# Patient Record
Sex: FEMALE | Race: WHITE | NOT HISPANIC OR LATINO | Employment: FULL TIME | URBAN - METROPOLITAN AREA
[De-identification: names, ages, dates, MRNs, and addresses within clinical notes are randomized per-mention and may not be internally consistent; named-entity substitution may affect disease eponyms.]

---

## 2022-01-17 ENCOUNTER — APPOINTMENT (OUTPATIENT)
Dept: RADIOLOGY | Facility: HOSPITAL | Age: 22
End: 2022-01-17
Attending: EMERGENCY MEDICINE
Payer: COMMERCIAL

## 2022-01-17 ENCOUNTER — HOSPITAL ENCOUNTER (EMERGENCY)
Facility: HOSPITAL | Age: 22
Discharge: HOME | End: 2022-01-17
Attending: EMERGENCY MEDICINE | Admitting: EMERGENCY MEDICINE
Payer: COMMERCIAL

## 2022-01-17 VITALS
HEIGHT: 64 IN | DIASTOLIC BLOOD PRESSURE: 90 MMHG | OXYGEN SATURATION: 100 % | WEIGHT: 98 LBS | SYSTOLIC BLOOD PRESSURE: 122 MMHG | BODY MASS INDEX: 16.73 KG/M2 | TEMPERATURE: 97.8 F | HEART RATE: 104 BPM | RESPIRATION RATE: 16 BRPM

## 2022-01-17 DIAGNOSIS — U07.1 INFECTION DUE TO 2019 NOVEL CORONAVIRUS: ICD-10-CM

## 2022-01-17 PROCEDURE — 99284 EMERGENCY DEPT VISIT MOD MDM: CPT | Mod: 25

## 2022-01-17 PROCEDURE — 93005 ELECTROCARDIOGRAM TRACING: CPT | Performed by: EMERGENCY MEDICINE

## 2022-01-17 PROCEDURE — 71046 X-RAY EXAM CHEST 2 VIEWS: CPT

## 2022-01-17 ASSESSMENT — MIFFLIN-ST. JEOR: SCORE: 1204.53

## 2022-01-17 NOTE — ED TRIAGE NOTES
Tested positive for COVID 1/12/22. Pt feels short of breath. Get episodes where it feels like she'ssuffocating and hard to expand lungs. % RA in triage. C/o chest heaviness.

## 2022-01-17 NOTE — ED PROVIDER NOTES
EMERGENCY DEPARTMENT ENCOUnter      NAME: Qi Locke  AGE: 21 year old female  YOB: 2000  MRN: [id number]  EVALUATION DATE & TIME: No admission date for patient encounter.    PCP: No primary care provider on file.    ED PROVIDER: Jesu Wick DO      Chief Complaint   Patient presents with     COVID +     Shortness of Breath         FINAL IMPRESSION:  1. Infection due to 2019 novel coronavirus          ED COURSE & MEDICAL DECISION MAKING:    The patient presents to the emergency department today with complaints of shortness of breath over the past few days.  She also complained of some chest tightness.  She was recently diagnosed with COVID.  She clinically appeared well on exam.  EKG and chest x-ray are unremarkable.  At this time I believe that her symptoms are related to her recent COVID diagnosis and feels she can be safely discharged home.  She is comfortable with this plan.    At the conclusion of the encounter I discussed the results of all of the tests and the disposition. The questions were answered. The patient or family acknowledged understanding and was agreeable with the care plan.       =================================================================    HPI      Qi Locke is a 21 year old female presenting to the Emergency Department with a chief complaint of dyspnea for the past few days.  Recently diagnosed with Covid.  Feels a tightness across her chest.        REVIEW OF SYSTEMS     Constitutional:  Denies fever or chills  HENT:  Denies sore throat   Respiratory: Positive for shortness of breath  Cardiovascular: Positive for chest tightness  GI:  Denies abdominal pain, nausea, or vomiting  Musculoskeletal:  Denies any new extremity pain   Skin:  Denies rash   Neurologic:  Denies headache, focal weakness or sensory changes    All other systems reviewed and are negative      PAST MEDICAL HISTORY:  No past medical history on file.    PAST SURGICAL HISTORY:  No past surgical  "history on file.        CURRENT MEDICATIONS:    ondansetron (ZOFRAN ODT) 4 MG disintegrating tablet        ALLERGIES:  No Known Allergies    FAMILY HISTORY:  No family history on file.    SOCIAL HISTORY:   Social History     Socioeconomic History     Marital status: Single     Spouse name: Not on file     Number of children: Not on file     Years of education: Not on file     Highest education level: Not on file   Occupational History     Not on file   Tobacco Use     Smoking status: Not on file     Smokeless tobacco: Not on file   Substance and Sexual Activity     Alcohol use: Not on file     Drug use: Not on file     Sexual activity: Not on file   Other Topics Concern     Not on file   Social History Narrative     Not on file     Social Determinants of Health     Financial Resource Strain: Not on file   Food Insecurity: Not on file   Transportation Needs: Not on file   Physical Activity: Not on file   Stress: Not on file   Social Connections: Not on file   Intimate Partner Violence: Not on file   Housing Stability: Not on file       VITAL SIGNS: /65   Pulse 75   Temp 97.8  F (36.6  C) (Temporal)   Resp 16   Ht 1.626 m (5' 4\")   Wt 44.5 kg (98 lb)   SpO2 97%   BMI 16.82 kg/m     Constitutional:  Well developed, Well nourished,  HENT:  Normocephalic, Atraumatic, Oropharynx moist, Nose normal.   Neck:  Normal range of motion, Supple, No stridor.   Eyes:  EOMI, Conjunctiva normal, No discharge.   Respiratory:  Breathing comfortably, No respiratory distress,    Cardiovascular:  Normal pulses   Musculoskeletal:  No edema or cyanosis, no deformities  Integument:  Dry, No erythema, No rash.  Neurologic:  Alert & oriented x 3, No obvious focal deficits noted.   Psychiatric:  Affect normal, Judgment normal, Mood normal.          RADIOLOGY:  I have independently reviewed and interpreted the above imaging, pending the final radiology read.  XR Chest 2 Views   Final Result   IMPRESSION: The lungs are clear. There " is no pneumothorax or pleural effusion. There is mild hyperinflation. The heart size and pulmonary vascularity are normal.          EKG:    Normal sinus rhythm at 65 bpm.  Normal axis.  No signs of acute ischemia.  QRS 84 ms,  ms.    I have independently reviewed and interpreted this EKG        Jesu Wick DO  Emergency Medicine  [facility] EMERGENCY DEPARTMENT  [facility] [id number] [phone]  Dept: [phone]       Jesu Wick MD  01/17/22 4705

## 2022-01-17 NOTE — ED NOTES
"Unvaccinated Pt is upset and tearful states \"why can't they give me something\" \"I can't sleep\" \"it feels like I have a plastic bag over my head\". Instructed tx symptoms with increased fluid intake tylenol, ibuprofen.  O2 sats 100% RA.   "

## 2022-01-17 NOTE — DISCHARGE INSTRUCTIONS
Your symptoms today appear to be related to your recent COVID diagnosis.  During plenty of fluids and follow-up closely with your primary care doctor.  Return to the emergency department if you develop any worsening symptoms or if you have any other concerns.

## 2022-01-18 ENCOUNTER — PATIENT OUTREACH (OUTPATIENT)
Dept: CARE COORDINATION | Facility: CLINIC | Age: 22
End: 2022-01-18
Payer: COMMERCIAL

## 2022-01-18 DIAGNOSIS — Z71.89 OTHER SPECIFIED COUNSELING: ICD-10-CM

## 2022-01-18 LAB
ATRIAL RATE - MUSE: 65 BPM
DIASTOLIC BLOOD PRESSURE - MUSE: NORMAL MMHG
INTERPRETATION ECG - MUSE: NORMAL
P AXIS - MUSE: 52 DEGREES
PR INTERVAL - MUSE: 124 MS
QRS DURATION - MUSE: 84 MS
QT - MUSE: 428 MS
QTC - MUSE: 445 MS
R AXIS - MUSE: 87 DEGREES
SYSTOLIC BLOOD PRESSURE - MUSE: NORMAL MMHG
T AXIS - MUSE: 65 DEGREES
VENTRICULAR RATE- MUSE: 65 BPM

## 2022-01-18 NOTE — PROGRESS NOTES
"Clinic Care Coordination Contact  [facility]: Post-Discharge Note  SITUATION                                                      Admission:    Admission Date: 01/17/22   Reason for Admission: Infection due to 2019 novel coronavirus  Discharge:   Discharge Date: 01/17/22  Discharge Diagnosis: Infection due to 2019 novel coronavirus    BACKGROUND                                                      Qi Locke is a 21 year old female presenting to the Emergency Department with a chief complaint of dyspnea for the past few days.  Recently diagnosed with Covid.  Feels a tightness across her chest.    ASSESSMENT      Enrollment  Primary Care Care Coordination Status: Not a Candidate    Discharge Assessment  How are you doing now that you are home?: \"Still experiencing pain with breathing\"  How are your symptoms? (Red Flag symptoms escalate to triage hotline per guidelines): Unchanged  Do you feel your condition is stable enough to be safe at home until your provider visit?: Yes  Does the patient have their discharge instructions? : Yes  Does the patient have questions regarding their discharge instructions? : No  Were you started on any new medications or were there changes to any of your previous medications? : No  Does the patient have all of their medications?: Yes  Do you have questions regarding any of your medications? : No  Do you have all of your needed medical supplies or equipment (DME)?  (i.e. oxygen tank, CPAP, cane, etc.): Yes  Discharge follow-up appointment scheduled within 14 calendar days? : No  Is patient agreeable to assistance with scheduling? : No    Post-op ([facility] Only)  If the patient had a surgery or procedure, do they have any questions for a nurse?: No      PLAN                                                      Outpatient Plan:    Follow up with your doctor in 2 days (1/19/2022)    No future appointments.      For any urgent concerns, please contact our 24 hour nurse triage " line: [phone] ([phone])         MINOO Wu  [phone]  [facility]

## 2024-09-07 ENCOUNTER — OFFICE VISIT (OUTPATIENT)
Dept: FAMILY MEDICINE | Facility: CLINIC | Age: 24
End: 2024-09-07
Payer: MEDICAID

## 2024-09-07 VITALS
RESPIRATION RATE: 18 BRPM | DIASTOLIC BLOOD PRESSURE: 73 MMHG | TEMPERATURE: 98.2 F | SYSTOLIC BLOOD PRESSURE: 116 MMHG | HEART RATE: 77 BPM | OXYGEN SATURATION: 98 %

## 2024-09-07 DIAGNOSIS — L02.91 ABSCESS: Primary | ICD-10-CM

## 2024-09-07 PROCEDURE — 87077 CULTURE AEROBIC IDENTIFY: CPT | Performed by: PHYSICIAN ASSISTANT

## 2024-09-07 PROCEDURE — 87070 CULTURE OTHR SPECIMN AEROBIC: CPT | Performed by: PHYSICIAN ASSISTANT

## 2024-09-07 PROCEDURE — 10060 I&D ABSCESS SIMPLE/SINGLE: CPT | Performed by: PHYSICIAN ASSISTANT

## 2024-09-08 NOTE — PATIENT INSTRUCTIONS
Keep dry for the next 24 hours.   Change bandage if it becomes dirty or saturated.   Take Tylenol as needed for pain.   Follow up if it refills and is inflamed or if you develop growing redness.   If the cyts returns and is just a hard ball you can have them removed, but I recommend following up with primary care in that case.   Your wound culture will be back in a few days. We will only call if we need to start you on a treatment.

## 2024-09-08 NOTE — PROGRESS NOTES
Patient presents with:  Mass: 1 month Bump on left side of ear.          ICD-10-CM    1. Abscess  L02.91 Swab Aerobic Bacterial Culture Routine Without Gram Stain          Patient Instructions   Keep dry for the next 24 hours.   Change bandage if it becomes dirty or saturated.   Take Tylenol as needed for pain.   Follow up if it refills and is inflamed or if you develop growing redness.   If the cyts returns and is just a hard ball you can have them removed, but I recommend following up with primary care in that case.   Your wound culture will be back in a few days. We will only call if we need to start you on a treatment.     HPI:  Summer R Chucky is a 23 year old female who presents today with concerns of bump behind the right ear that is been present for 1 month, became more squishy and painful over the past 1 week.  No fevers or chills.    History obtained from the patient.    Problem List:  There are no relevant problems documented for this patient.      No past medical history on file.    Social History     Tobacco Use    Smoking status: Some Days     Types: Vaping Device    Smokeless tobacco: Not on file   Substance Use Topics    Alcohol use: Not on file         Review of Systems    Vitals:    09/07/24 1913   BP: 116/73   BP Location: Left arm   Patient Position: Sitting   Cuff Size: Adult Regular   Pulse: 77   Resp: 18   Temp: 98.2  F (36.8  C)   TempSrc: Oral   SpO2: 98%       Physical Exam  Vitals and nursing note reviewed.   Constitutional:       General: She is not in acute distress.     Appearance: She is not toxic-appearing or diaphoretic.   HENT:      Head: Normocephalic and atraumatic.      Right Ear: External ear normal.      Left Ear: External ear normal.   Eyes:      Conjunctiva/sclera: Conjunctivae normal.   Pulmonary:      Effort: Pulmonary effort is normal. No respiratory distress.   Skin:     Comments: Fluctuant bump behind right earlobe approximately the size of a large blueberry.  No active  draining.   Neurological:      Mental Status: She is alert.   Psychiatric:         Mood and Affect: Mood normal.         Behavior: Behavior normal.         Thought Content: Thought content normal.         Judgment: Judgment normal.       PROCEDURE: Incision and Drainage   SITE: Behind right earlobe   INDICATIONS: Infected sebaceous cyst   CONSENT:  Risks, benefits and alternatives were discussed with  and consent for procedure was obtained.       MEDICATION: 1% Lidocaine.  Injecting 3 mls.   NOTE: The area was prepped with chlorhexidine and draped off in the usual sterile fashion.  Local anesthetic was injected subcutaneously with anesthesia effects demonstrated prior to proceeding.  The area of maximal fluctuance was incised with a #11 blade.  The abscess was drained.  The abscess cavity was bluntly explored to separate any loculations.   A sterile dressing was placed over the area.   COMPLEXITY: Simple    Simple = single, furuncle, paronychia, superficial  Complex = multiple or abscess requiring probing, loculations, packing placement   COMPLICATIONS:  None       At the end of the encounter, I discussed results, diagnosis, medications. Discussed red flags for immediate return to clinic/ER, as well as indications for follow up if no improvement. Patient understood and agreed to plan. Patient was stable for discharge.

## 2024-09-09 LAB
BACTERIA SPEC CULT: ABNORMAL
BACTERIA SPEC CULT: ABNORMAL

## 2024-09-29 ENCOUNTER — HEALTH MAINTENANCE LETTER (OUTPATIENT)
Age: 24
End: 2024-09-29

## 2025-08-13 ENCOUNTER — HOSPITAL ENCOUNTER (OUTPATIENT)
Dept: GENERAL RADIOLOGY | Facility: HOSPITAL | Age: 25
Discharge: HOME | End: 2025-08-13
Attending: PHYSICIAN ASSISTANT
Payer: COMMERCIAL

## 2025-08-13 ENCOUNTER — OFFICE VISIT (OUTPATIENT)
Dept: URGENT CARE | Facility: URGENT CARE | Age: 25
End: 2025-08-13
Payer: COMMERCIAL

## 2025-08-13 VITALS
SYSTOLIC BLOOD PRESSURE: 102 MMHG | DIASTOLIC BLOOD PRESSURE: 68 MMHG | OXYGEN SATURATION: 95 % | RESPIRATION RATE: 24 BRPM | TEMPERATURE: 99.6 F | HEART RATE: 116 BPM

## 2025-08-13 DIAGNOSIS — R05.1 ACUTE COUGH: ICD-10-CM

## 2025-08-13 DIAGNOSIS — J18.9 PNEUMONIA OF LEFT LOWER LOBE DUE TO INFECTIOUS ORGANISM: Primary | ICD-10-CM

## 2025-08-13 DIAGNOSIS — R06.2 WHEEZING: ICD-10-CM

## 2025-08-13 DIAGNOSIS — R07.89 CHEST WALL PAIN: ICD-10-CM

## 2025-08-13 PROBLEM — F11.20: Status: ACTIVE | Noted: 2022-10-12

## 2025-08-13 PROBLEM — F50.019 ANOREXIA NERVOSA, RESTRICTING TYPE (H): Status: ACTIVE | Noted: 2020-01-10

## 2025-08-13 PROBLEM — E05.90 SUBCLINICAL HYPERTHYROIDISM: Status: ACTIVE | Noted: 2021-06-10

## 2025-08-13 PROBLEM — R63.0 ANOREXIA: Status: ACTIVE | Noted: 2021-06-07

## 2025-08-13 PROBLEM — R79.89 ELEVATED LFTS: Status: ACTIVE | Noted: 2020-01-30

## 2025-08-13 PROBLEM — O99.321: Status: ACTIVE | Noted: 2022-10-12

## 2025-08-13 PROBLEM — O09.32 SUPERVISION OF HIGH-RISK PREGNANCY WITH INSUFFICIENT PRENATAL CARE IN SECOND TRIMESTER: Status: ACTIVE | Noted: 2024-01-27

## 2025-08-13 PROBLEM — F12.90 CANNABIS USE, UNCOMPLICATED: Status: ACTIVE | Noted: 2024-06-07

## 2025-08-13 PROBLEM — O21.9 NAUSEA AND VOMITING IN PREGNANCY: Status: ACTIVE | Noted: 2024-05-10

## 2025-08-13 PROBLEM — O60.03 PRETERM LABOR IN THIRD TRIMESTER WITHOUT DELIVERY: Status: ACTIVE | Noted: 2024-05-14

## 2025-08-13 PROBLEM — O44.00 PLACENTA PREVIA ANTEPARTUM: Status: ACTIVE | Noted: 2024-01-30

## 2025-08-13 PROBLEM — O47.00 PREMATURE UTERINE CONTRACTIONS: Status: ACTIVE | Noted: 2024-05-06

## 2025-08-13 PROBLEM — D64.9 ANEMIA, UNSPECIFIED: Status: ACTIVE | Noted: 2024-03-07

## 2025-08-13 PROBLEM — F19.90 SUBSTANCE USE DISORDER: Status: ACTIVE | Noted: 2022-10-12

## 2025-08-13 PROBLEM — F11.20 OPIOID DEPENDENCE (H): Status: ACTIVE | Noted: 2021-06-07

## 2025-08-13 PROBLEM — O09.90 HIGH-RISK PREGNANCY: Status: ACTIVE | Noted: 2022-10-12

## 2025-08-13 PROBLEM — O26.899 PREGNANCY RELATED NAUSEA, ANTEPARTUM: Status: ACTIVE | Noted: 2022-10-12

## 2025-08-13 PROBLEM — F32.89 OTHER SPECIFIED DEPRESSIVE EPISODES: Status: ACTIVE | Noted: 2020-02-07

## 2025-08-13 PROBLEM — O09.893 RISK OF PRETERM LABOR IN THIRD TRIMESTER: Status: ACTIVE | Noted: 2024-05-05

## 2025-08-13 PROBLEM — R11.0 PREGNANCY RELATED NAUSEA, ANTEPARTUM: Status: ACTIVE | Noted: 2022-10-12

## 2025-08-13 PROCEDURE — 99214 OFFICE O/P EST MOD 30 MIN: CPT | Performed by: PHYSICIAN ASSISTANT

## 2025-08-13 PROCEDURE — 3078F DIAST BP <80 MM HG: CPT | Performed by: PHYSICIAN ASSISTANT

## 2025-08-13 PROCEDURE — 3074F SYST BP LT 130 MM HG: CPT | Performed by: PHYSICIAN ASSISTANT

## 2025-08-13 PROCEDURE — 71046 X-RAY EXAM CHEST 2 VIEWS: CPT

## 2025-08-13 PROCEDURE — 87635 SARS-COV-2 COVID-19 AMP PRB: CPT | Performed by: PHYSICIAN ASSISTANT

## 2025-08-13 RX ORDER — ALBUTEROL SULFATE 90 UG/1
2 INHALANT RESPIRATORY (INHALATION) EVERY 4 HOURS PRN
Qty: 18 G | Refills: 0 | Status: SHIPPED | OUTPATIENT
Start: 2025-08-13

## 2025-08-13 RX ORDER — AZITHROMYCIN 250 MG/1
TABLET, FILM COATED ORAL
Qty: 6 TABLET | Refills: 0 | Status: SHIPPED | OUTPATIENT
Start: 2025-08-13 | End: 2025-08-18

## 2025-08-13 RX ORDER — ACETAMINOPHEN 500 MG
1000 TABLET ORAL EVERY 6 HOURS PRN
Qty: 50 TABLET | Refills: 0 | Status: SHIPPED | OUTPATIENT
Start: 2025-08-13

## 2025-08-13 RX ORDER — ALBUTEROL SULFATE 90 UG/1
6 INHALANT RESPIRATORY (INHALATION) ONCE
Status: COMPLETED | OUTPATIENT
Start: 2025-08-13 | End: 2025-08-13

## 2025-08-13 RX ORDER — BENZONATATE 200 MG/1
200 CAPSULE ORAL 3 TIMES DAILY PRN
Qty: 30 CAPSULE | Refills: 0 | Status: SHIPPED | OUTPATIENT
Start: 2025-08-13

## 2025-08-13 RX ORDER — IBUPROFEN 200 MG
400 TABLET ORAL ONCE
Status: COMPLETED | OUTPATIENT
Start: 2025-08-13 | End: 2025-08-13

## 2025-08-13 RX ADMIN — Medication 400 MG: at 19:55

## 2025-08-13 RX ADMIN — ALBUTEROL SULFATE 6 PUFF: 90 INHALANT RESPIRATORY (INHALATION) at 19:44

## 2025-08-14 LAB — SARS-COV-2 RNA RESP QL NAA+PROBE: NEGATIVE

## 2025-09-04 ENCOUNTER — OFFICE VISIT (OUTPATIENT)
Dept: URGENT CARE | Facility: URGENT CARE | Age: 25
End: 2025-09-04
Payer: COMMERCIAL

## 2025-09-04 VITALS
TEMPERATURE: 98 F | DIASTOLIC BLOOD PRESSURE: 53 MMHG | OXYGEN SATURATION: 96 % | RESPIRATION RATE: 16 BRPM | HEART RATE: 55 BPM | SYSTOLIC BLOOD PRESSURE: 95 MMHG

## 2025-09-04 DIAGNOSIS — N10 ACUTE PYELONEPHRITIS: Primary | ICD-10-CM

## 2025-09-04 DIAGNOSIS — R39.89 URINARY PROBLEM: ICD-10-CM

## 2025-09-04 LAB
ALBUMIN UR-MCNC: 30 MG/DL
APPEARANCE UR: ABNORMAL
BACTERIA #/AREA URNS HPF: ABNORMAL /HPF
BILIRUB UR QL STRIP: NEGATIVE
COLOR UR AUTO: ABNORMAL
GLUCOSE UR STRIP-MCNC: NEGATIVE MG/DL
HGB UR QL STRIP: ABNORMAL
KETONES UR STRIP-MCNC: ABNORMAL MG/DL
LEUKOCYTE ESTERASE UR QL STRIP: ABNORMAL
NITRATE UR QL: POSITIVE
PH UR STRIP: 6 [PH] (ref 5–8)
RBC #/AREA URNS AUTO: ABNORMAL /HPF
SP GR UR STRIP: 1.02 (ref 1–1.03)
SQUAMOUS #/AREA URNS AUTO: ABNORMAL /LPF
UROBILINOGEN UR STRIP-ACNC: 2 E.U./DL
WBC #/AREA URNS AUTO: >100 /HPF

## 2025-09-04 RX ORDER — CEFTRIAXONE SODIUM 1 G
1 VIAL (EA) INJECTION ONCE
Status: COMPLETED | OUTPATIENT
Start: 2025-09-04 | End: 2025-09-04

## 2025-09-04 RX ORDER — SULFAMETHOXAZOLE AND TRIMETHOPRIM 800; 160 MG/1; MG/1
1 TABLET ORAL 2 TIMES DAILY
Qty: 14 TABLET | Refills: 0 | Status: SHIPPED | OUTPATIENT
Start: 2025-09-04 | End: 2025-09-11

## 2025-09-04 RX ADMIN — Medication 1 G: at 13:56
